# Patient Record
Sex: FEMALE | Race: OTHER | Employment: FULL TIME
[De-identification: names, ages, dates, MRNs, and addresses within clinical notes are randomized per-mention and may not be internally consistent; named-entity substitution may affect disease eponyms.]

---

## 2023-03-25 ENCOUNTER — APPOINTMENT (OUTPATIENT)
Facility: HOSPITAL | Age: 58
End: 2023-03-25
Payer: COMMERCIAL

## 2023-03-25 ENCOUNTER — HOSPITAL ENCOUNTER (EMERGENCY)
Facility: HOSPITAL | Age: 58
Discharge: HOME | End: 2023-03-25
Attending: EMERGENCY MEDICINE
Payer: COMMERCIAL

## 2023-03-25 VITALS
HEART RATE: 86 BPM | DIASTOLIC BLOOD PRESSURE: 71 MMHG | RESPIRATION RATE: 16 BRPM | BODY MASS INDEX: 33.44 KG/M2 | SYSTOLIC BLOOD PRESSURE: 112 MMHG | TEMPERATURE: 97.9 F | OXYGEN SATURATION: 98 % | HEIGHT: 57 IN | WEIGHT: 155 LBS

## 2023-03-25 DIAGNOSIS — M25.562 LEFT KNEE PAIN, UNSPECIFIED CHRONICITY: Primary | ICD-10-CM

## 2023-03-25 PROCEDURE — 73562 X-RAY EXAM OF KNEE 3: CPT

## 2023-03-25 PROCEDURE — 99283 EMERGENCY DEPT VISIT LOW MDM: CPT | Performed by: NURSE PRACTITIONER

## 2023-03-25 PROCEDURE — 6370000000 HC RX 637 (ALT 250 FOR IP): Performed by: NURSE PRACTITIONER

## 2023-03-25 RX ORDER — ACETAMINOPHEN 500 MG
1000 TABLET ORAL
Status: COMPLETED | OUTPATIENT
Start: 2023-03-25 | End: 2023-03-25

## 2023-03-25 RX ADMIN — ACETAMINOPHEN 1000 MG: 500 TABLET ORAL at 17:48

## 2023-03-25 NOTE — ED TRIAGE NOTES
Patient arrived via EMS c/o right sided knee pain x 1 week. Pt states that she was at [address] and felt a 'pop'.  States that it is hard to bend/ambulate     No injuries/trauma, denies surgeries

## 2023-03-25 NOTE — DISCHARGE INSTRUCTIONS
Use knee immobilizer and crutches, may use Tylenol OTC according to package directions for pain  Ice to left knee as discussed, do not place ice directly on skin  Call for Ortho follow-up on Monday  Return to care for new or worsening symptoms to include calf swelling or pain, numbness or tingling, coldness to foot or other concerning symptoms

## 2023-03-25 NOTE — ED NOTES
Bedside and Verbal shift change report given to Brian Peng  (oncoming nurse) by Jason Sue RN   (offgoing nurse). Report included the following information Nurse Handoff Report.         [address], REGULO  03/25/23 1924

## 2023-03-25 NOTE — Clinical Note
Romero Soldoleg was seen and treated in our emergency department on 3/25/2023. She may return to work on 03/29/2023. If you have any questions or concerns, please don't hesitate to call.       BRANDAN Dickerson - NP

## 2023-03-25 NOTE — ED PROVIDER NOTES
[facility] EMERGENCY DEPT  EMERGENCY DEPARTMENT ENCOUNTER       Pt Name: Mila Hudson  MRN: [id number]  [place] 1965  Date of evaluation: 3/25/2023  PCP: Rayna Pace MD  Note Started: 8:25 PM 3/25/23     CHIEF COMPLAINT       Chief Complaint   Patient presents with    Knee Pain        HISTORY OF PRESENT ILLNESS: 1 or more elements      History From: Patient  HPI Limitations: None  Chronic Conditions: GERD  Social Determinants affecting Dx or Tx: None     Mila Hudson is a 62 y.o. female who presents to ED c/o L knee pain. Patient reports she has had intermittent left knee pain and \"locking up\" for about 3 months, worsening over the last week with no known injury. Patient reports she was trying to get herself comfortable on a motorized cart at [facility] this afternoon when she felt a pop to left knee states she is unable to move or bear weight on left knee at this time. Patient denies paresthesias. Patient has not had medical evaluation of left knee prior to today, has attempted to take Aleve at home for the pain without relief. Nursing Notes were all reviewed and agreed with or any disagreements were addressed in the HPI. PAST HISTORY     Past Medical History:  No past medical history on file. Past Surgical History:  No past surgical history on file. Family History:  No family history on file. Social History:  Social History     Socioeconomic History    Marital status:        Allergies:  No Known Allergies    CURRENT MEDICATIONS      No current facility-administered medications for this encounter. No current outpatient medications on file. PHYSICAL EXAM      Vitals:    03/25/23 1713 03/25/23 1815 03/25/23 1915 03/25/23 1945   BP: (!) 141/84 130/72 107/73 112/71   Pulse: 86      Resp: 16      Temp: 97.9 °F (36.6 °C)      SpO2: 100% 99% 98% 98%   Weight: 155 lb (70.3 kg)      Height: 4' 9\" (1.448 m)        Physical Exam  Vitals and nursing note reviewed. none  Procedures         CRITICAL CARE TIME   None    EMERGENCY DEPARTMENT COURSE and DIFFERENTIAL DIAGNOSIS/MDM   Vitals:    Vitals:    03/25/23 1713 03/25/23 1815 03/25/23 1915 03/25/23 1945   BP: (!) 141/84 130/72 107/73 112/71   Pulse: 86      Resp: 16      Temp: 97.9 °F (36.6 °C)      SpO2: 100% 99% 98% 98%   Weight: 155 lb (70.3 kg)      Height: 4' 9\" (1.448 m)          Patient was given the following medications:  Medications   acetaminophen (TYLENOL) tablet 1,000 mg (1,000 mg Oral Given 3/25/23 1748)           Records Reviewed (source and summary): Nursing notes. ED COURSE       Medial Decision Making:  DDX: Meniscal tear, tendon injury, strain/sprain, fracture, dislocation, gout, septic joint    Alert, well-appearing 62 y.o. female  In evaluation of the above differential diagnosis, consideration was given to the following tests and treatments: X-ray of left knee showing no fracture or dislocation, no erythema or warmth on exam.  Highest degree of suspicion for meniscal tear, possible tendon injury. Pulses, capillary refill, sensation all normal distal to left knee at this time, compartments soft, do not feel emergent MRI is needed at this time. I discussed each of these tests and considerations with the patient. They agree with the plan of discharge home with knee immobilizer and crutches, will call Ortho for follow-up on Monday. Return precautions given. FINAL IMPRESSION     1.  Left knee pain, unspecified chronicity            DISPOSITION/PLAN   DISPOSITION Decision To Discharge 03/25/2023 08:06:24 PM             PATIENT REFERRED TO:  Lilia Shukla MD  [address] [place] [address] Rd  [address] [facility]  [phone]    Schedule an appointment as soon as possible for a visit   For ER follow-up    [facility] EMERGENC[facility]  [address] [place] [address]  [phone]    As needed, If symptoms worsen       DISCHARGE MEDICATIONS:     Medication List      You have not been

## 2023-03-26 NOTE — ED NOTES
Discharge instructions reviewed with pt. Pt verbalized understanding of all instructions. Pt instructed on use of knee immobilizer and crutches.       Greg Trent RN  03/25/23 2005